# Patient Record
Sex: FEMALE | Race: WHITE | Employment: FULL TIME | URBAN - METROPOLITAN AREA
[De-identification: names, ages, dates, MRNs, and addresses within clinical notes are randomized per-mention and may not be internally consistent; named-entity substitution may affect disease eponyms.]

---

## 2018-09-04 ENCOUNTER — HOSPITAL ENCOUNTER (EMERGENCY)
Age: 28
Discharge: HOME | End: 2018-09-04
Attending: EMERGENCY MEDICINE
Payer: COMMERCIAL

## 2018-09-04 VITALS
SYSTOLIC BLOOD PRESSURE: 98 MMHG | BODY MASS INDEX: 29.8 KG/M2 | WEIGHT: 220 LBS | TEMPERATURE: 98.2 F | HEART RATE: 87 BPM | RESPIRATION RATE: 14 BRPM | OXYGEN SATURATION: 100 % | DIASTOLIC BLOOD PRESSURE: 50 MMHG | HEIGHT: 72 IN

## 2018-09-04 DIAGNOSIS — R11.2 NAUSEA VOMITING AND DIARRHEA: ICD-10-CM

## 2018-09-04 DIAGNOSIS — K52.9 GASTROENTERITIS: Primary | ICD-10-CM

## 2018-09-04 DIAGNOSIS — R19.7 NAUSEA VOMITING AND DIARRHEA: ICD-10-CM

## 2018-09-04 PROCEDURE — 99284 EMERGENCY DEPT VISIT MOD MDM: CPT

## 2018-09-04 PROCEDURE — 6370000000 HC RX 637 (ALT 250 FOR IP): Performed by: EMERGENCY MEDICINE

## 2018-09-04 PROCEDURE — 6360000002 HC RX W HCPCS: Performed by: EMERGENCY MEDICINE

## 2018-09-04 RX ORDER — ONDANSETRON 4 MG/1
4 TABLET, ORALLY DISINTEGRATING ORAL EVERY 8 HOURS PRN
Qty: 20 TABLET | Refills: 0 | Status: SHIPPED | OUTPATIENT
Start: 2018-09-04

## 2018-09-04 RX ORDER — LOPERAMIDE HYDROCHLORIDE 2 MG/1
4 TABLET ORAL EVERY 12 HOURS PRN
Qty: 30 TABLET | Refills: 0 | Status: SHIPPED | OUTPATIENT
Start: 2018-09-04

## 2018-09-04 RX ORDER — LOPERAMIDE HYDROCHLORIDE 2 MG/1
4 CAPSULE ORAL ONCE
Status: COMPLETED | OUTPATIENT
Start: 2018-09-04 | End: 2018-09-04

## 2018-09-04 RX ORDER — ONDANSETRON 4 MG/1
8 TABLET, ORALLY DISINTEGRATING ORAL ONCE
Status: COMPLETED | OUTPATIENT
Start: 2018-09-04 | End: 2018-09-04

## 2018-09-04 RX ORDER — CITALOPRAM 20 MG/1
20 TABLET ORAL DAILY
COMMUNITY

## 2018-09-04 RX ADMIN — ONDANSETRON 8 MG: 4 TABLET, ORALLY DISINTEGRATING ORAL at 02:51

## 2018-09-04 RX ADMIN — LOPERAMIDE HYDROCHLORIDE 4 MG: 2 CAPSULE ORAL at 02:51

## 2018-09-04 ASSESSMENT — PAIN DESCRIPTION - ORIENTATION: ORIENTATION: LOWER

## 2018-09-04 ASSESSMENT — PAIN DESCRIPTION - DESCRIPTORS: DESCRIPTORS: CRAMPING

## 2018-09-04 ASSESSMENT — PAIN DESCRIPTION - LOCATION: LOCATION: ABDOMEN

## 2018-09-04 ASSESSMENT — PAIN SCALES - GENERAL: PAINLEVEL_OUTOF10: 6

## 2018-09-04 ASSESSMENT — PAIN DESCRIPTION - FREQUENCY: FREQUENCY: INTERMITTENT

## 2018-09-04 ASSESSMENT — PAIN DESCRIPTION - PAIN TYPE: TYPE: ACUTE PAIN

## 2018-09-04 NOTE — ED PROVIDER NOTES
EMERGENCY DEPARTMENT PHYSICIAN DOCUMENTATION      CHIEF COMPLAINT  Nausea (V/D started at 2300 last night, with family that children had vomiting )      HISTORY OF PRESENT ILLNESS  Stacie Zapien is a 32 y.o. female with complaint of Nausea (V/D started at 2300 last night, with family that children had vomiting )  . Onset of symptoms 4 hours   Pt reports onset of nausea and vomiting and diarrhea. Vomiting is clear, no significant blood. Diarrhea is clear/stool colored and non-bloody. Associated with minimal abdominal pain. Pain is  crampy, no radiation, mild, generalized and very mild  No associated urinary discomfort or pain. No fevers/chills. REVIEW OF SYSTEMS  A full 10 point Review of Systems was performed and is negative aside from pertinent positives mentioned in HPI    ALLERGIES:  Allergies   Allergen Reactions    Nickel Rash       PAST HISTORY  History reviewed. No pertinent past medical history. Family History   Problem Relation Age of Onset    High Blood Pressure Father     Diabetes Maternal Grandmother     Diabetes Maternal Grandfather     Heart Disease Paternal Grandmother     Heart Disease Paternal Grandfather        No current facility-administered medications on file prior to encounter.       Current Outpatient Prescriptions on File Prior to Encounter   Medication Sig Dispense Refill    levonorgestrel-ethinyl estradiol (AVIANE;ALESSE;LESSINA) 0.1-20 MG-MCG per tablet Take 1 tablet by mouth every evening      Cyanocobalamin (VITAMIN B-12) 1000 MCG SUBL Place 1 tablet under the tongue daily         Social History   Substance Use Topics    Smoking status: Never Smoker    Smokeless tobacco: Never Used    Alcohol use No         EXAM:   Presentation Vital Signs: BP (!) 98/50   Pulse 87   Temp 98.2 °F (36.8 °C) (Oral)   Resp 14   Ht 6' (1.829 m)   Wt 99.8 kg (220 lb)   LMP 08/20/2018   SpO2 100%   BMI 29.84 kg/m²   General: Well nourished, no acute distress  Head: No

## 2018-09-04 NOTE — ED NOTES
Pt to ed with c/o N/V/D, states just returned from [place] recently and was staying with family who had 2 girls with n/v. States n/v started around 2300 last night.  Exam per MD.      Arun Jaime, RN  09/04/18 [id number]